# Patient Record
(demographics unavailable — no encounter records)

---

## 2024-10-10 NOTE — HEALTH RISK ASSESSMENT
[0] : 2) Feeling down, depressed, or hopeless: Not at all (0) [PHQ-2 Negative - No further assessment needed] : PHQ-2 Negative - No further assessment needed [Never] : Never [[id number]] : 0

## 2024-10-10 NOTE — ADDENDUM
[FreeTextEntry1] : This note was written by Milagro Garland on 10/10/2024 acting as medical scribe for Dr. Lubna Fleming. I, Dr. Lubna Fleming, have read and attest that all the information, medical decision making and discharge instructions within are true and accurate.

## 2024-10-10 NOTE — HISTORY OF PRESENT ILLNESS
[FreeTextEntry1] : 5-month f/u  [de-identified] : 28 y/o F with PMH of Hpylori here for 5-month f/u.   Denies chest pain, SOB, GARAY, dizziness, diaphoresis, palpitations, LE swelling, orthopnea, syncope, n/v, headache.

## 2024-10-21 NOTE — HISTORY OF PRESENT ILLNESS
[FreeTextEntry1] : 5-month f/u  [de-identified] : 26 y/o F with PMH of Hpylori here today for 5-month f/u.   Denies chest pain, SOB, GARAY, dizziness, diaphoresis, palpitations, LE swelling, orthopnea, syncope, n/v, headache.

## 2024-10-21 NOTE — ADDENDUM
[FreeTextEntry1] : This note was written by Milagro Garland on 10/15/2024 acting as medical scribe for Dr. Lubna Fleming. I, Dr. Lubna Fleming, have read and attest that all the information, medical decision making and discharge instructions within are true and accurate.

## 2024-10-21 NOTE — HEALTH RISK ASSESSMENT
Hello,  last fill date:01-  Last quantity:30    Thank You,  Meagan Bishop  Pharmacy Technician  [facility]  [phone]     [0] : 2) Feeling down, depressed, or hopeless: Not at all (0) [PHQ-2 Negative - No further assessment needed] : PHQ-2 Negative - No further assessment needed [Never] : Never [[id number]] : 0

## 2024-11-19 NOTE — HEALTH RISK ASSESSMENT
[0] : 2) Feeling down, depressed, or hopeless: Not at all (0) [PHQ-2 Negative - No further assessment needed] : PHQ-2 Negative - No further assessment needed [Never] : Never [[id number]] : 0

## 2024-11-19 NOTE — HEALTH RISK ASSESSMENT
[0] : 2) Feeling down, depressed, or hopeless: Not at all (0) [PHQ-2 Negative - No further assessment needed] : PHQ-2 Negative - No further assessment needed [Never] : Never [[id number]] : 0

## 2024-11-19 NOTE — HISTORY OF PRESENT ILLNESS
[FreeTextEntry1] : 6 month f/u ER f/u [de-identified] : Ms. KINSEY is a 28 year old F with PMHx of H-pylori presenting for 6 month f/u. Pt reports that her heart was racing really fast on 11/6 and feeling a warm sensation only in left arm. Heart rate was 184 with daughter's watch went to the ER. 2 weeks ago. Symptoms have not reoccurred since. In ER, Bloodwork and troponin were neg, EKG, CT chest were all normal per pt. Pt was found to have low magnesium at 1.5 so was given a magnesium drip. Sx resolved on their own without medication. Pt also saw gastro Dr. Guadalupe who reviewed CT abd that showed mesenteric LAD and he recommended doing EGD with EUS but she never did it. Denies chest pain, sob, abrams, dizziness, diaphoresis, palpitations, LE swelling, orthopnea, syncope, n/v, headache. Denies GI symptoms, weight loss.

## 2024-11-19 NOTE — HISTORY OF PRESENT ILLNESS
[FreeTextEntry1] : 6 month f/u ER f/u [de-identified] : Ms. KINSEY is a 28 year old F with PMHx of H-pylori presenting for 6 month f/u. Pt reports that her heart was racing really fast on 11/6 and feeling a warm sensation only in left arm. Heart rate was 184 with daughter's watch went to the ER. 2 weeks ago. Symptoms have not reoccurred since. In ER, Bloodwork and troponin were neg, EKG, CT chest were all normal per pt. Pt was found to have low magnesium at 1.5 so was given a magnesium drip. Sx resolved on their own without medication. Pt also saw gastro Dr. Guadalupe who reviewed CT abd that showed mesenteric LAD and he recommended doing EGD with EUS but she never did it. Denies chest pain, sob, abrams, dizziness, diaphoresis, palpitations, LE swelling, orthopnea, syncope, n/v, headache. Denies GI symptoms, weight loss.

## 2024-11-19 NOTE — HEALTH RISK ASSESSMENT
[0] : 2) Feeling down, depressed, or hopeless: Not at all (0) [PHQ-2 Negative - No further assessment needed] : PHQ-2 Negative - No further assessment needed [Never] : Never [[id number]] : 0

## 2024-11-19 NOTE — ADDENDUM
[FreeTextEntry1] : This note was written by Maria Elena Ortiz on 11/18/2024 acting as medical scribe for Dr. Lubna Fleming. I, Dr. Lubna Fleming, have read and attest that all the information, medical decision making and discharge instructions within are true and accurate.

## 2024-11-20 NOTE — HISTORY OF PRESENT ILLNESS
[FreeTextEntry1] :  28 year female here for mild elevation of prolactin   Reported Symptoms:   Vision problems: No Breast discharge (not pregnant or nursing): No Enlargement of breasts: No   Appreciable increased fat buildup in face, neck, back, abdomen, chest: generalized weight gain Arms and legs becoming thin: No Purple stretch marks: No Hypertension: No Proximal muscle weakness: No   Extra growth in the skull, hands, and feet: No Deepened voice: No Change in facial appearance: No Wide spacing of teeth: No Joint pain: No Snoring or sleep apnea: No Diabetes or impaired glucose tolerance: No

## 2024-11-20 NOTE — PHYSICAL EXAM
[Alert] : alert [Well Nourished] : well nourished [Obese] : obese [No Acute Distress] : no acute distress [Well Developed] : well developed [Normal Sclera/Conjunctiva] : normal sclera/conjunctiva [EOMI] : extra ocular movement intact [No Proptosis] : no proptosis [Normal Oropharynx] : the oropharynx was normal [Supple] : the neck was supple [Thyroid Not Enlarged] : the thyroid was not enlarged [No Respiratory Distress] : no respiratory distress [No Accessory Muscle Use] : no accessory muscle use [Clear to Auscultation] : lungs were clear to auscultation bilaterally [Normal S1, S2] : normal S1 and S2 [Normal Rate] : heart rate was normal [Regular Rhythm] : with a regular rhythm [No Edema] : no peripheral edema [Pedal Pulses Normal] : the pedal pulses are present [Normal Bowel Sounds] : normal bowel sounds [Not Tender] : non-tender [Not Distended] : not distended [Soft] : abdomen soft [Normal Anterior Cervical Nodes] : no anterior cervical lymphadenopathy [Normal Posterior Cervical Nodes] : no posterior cervical lymphadenopathy [No Spinal Tenderness] : no spinal tenderness [Spine Straight] : spine straight [No Stigmata of Cushings Syndrome] : no stigmata of Cushings Syndrome [Normal Gait] : normal gait [Normal Strength/Tone] : muscle strength and tone were normal [No Rash] : no rash [Acanthosis Nigricans] : no acanthosis nigricans [Normal Reflexes] : deep tendon reflexes were 2+ and symmetric [No Tremors] : no tremors [Oriented x3] : oriented to person, place, and time

## 2025-01-08 NOTE — HISTORY OF PRESENT ILLNESS
[FreeTextEntry1] : Dear Pantera,   I had the pleasure of seeing your patient MAGDALENO KINSEY for Cardiometabolic evaluation.   As you know, she  is a Pleasant, 28 year old with a past medical history of Obesity.PCOS. Hyperlipidemia =============== =============== Obesity.PCOS. Hyperlipidemia - Discussed diet and exercise at length - Check Lpa/CRP - Strongly prefer to start with dietician prior to diet  CC: F/U Weight and Heart Health - Notes No CP/SOB/Palps/Leg swelling - Reports No F/C/N/V/Headaches - Reports Normal Exercise Tolerance - Reports no medication changes - Reports normal mood/quality of life - Reports no diet changes - Reports no body aches - Reports no recent colds/viruse - Recent labs/imaging reviewed including from Risk Assessment for 10 Year ACC/AHA Pooled Risk Cohort Equation places this person at < 7.5% Risk of ASCVD

## 2025-01-08 NOTE — DISCUSSION/SUMMARY
[FreeTextEntry1] : In summary   Mesha, 28 year old with a past medical history of Obesity.PCOS. Hyperlipidemia =============== =============== Obesity.PCOS. Hyperlipidemia - Discussed diet and exercise at length - Check Lpa/CRP - Strongly prefer to start with dietician prior to diet   Obesity management - Assessed - Impression is active - Sleep apnea assessed - Recent labs/imaging reviewed including from   Cholesterol management - Assessed - Impression is active; changes as per above - Discussed diet and exercise at length - Any lifestyle/medication changes as per above   Risk factors for cardiomyopathy - Assessed - Impression is stable   Cardiac Health optimization - Discussed diet and exercise at length - Discussed importance of monitoring and re-assessment of cardiac health on further visits      Pantera, kind thanks for the referral.   Diego García MD [facility] ROSALINDA BRIGHT Director, Preventive Cardiology Ericka Basurto [facility] [place]                                                                                                                                                                                                                                                                     --                                                                                                                                                                                                                                                                                                                                                                                                                                                                                                                                                                                                                                                                                                                            -----------

## 2025-01-08 NOTE — DISCUSSION/SUMMARY
[FreeTextEntry1] : In summary   Mesha, 28 year old with a past medical history of Obesity.PCOS. Hyperlipidemia =============== =============== Obesity.PCOS. Hyperlipidemia - Discussed diet and exercise at length - Check Lpa/CRP - Strongly prefer to start with dietician prior to diet   Obesity management - Assessed - Impression is active - Sleep apnea assessed - Recent labs/imaging reviewed including from   Cholesterol management - Assessed - Impression is active; changes as per above - Discussed diet and exercise at length - Any lifestyle/medication changes as per above   Risk factors for cardiomyopathy - Assessed - Impression is stable   Cardiac Health optimization - Discussed diet and exercise at length - Discussed importance of monitoring and re-assessment of cardiac health on further visits      Pantera, kind thanks for the referral.   Diego García MD [facility] ROSALINDA BRIGHT Director, Preventive Cardiology Ericka Basurto [facility] [place]                                                                                                                                                                                                                                                                     --                                                                                                                                                                                                                                                                                                                                                                                                                                                                                                                                                                                                                                                                                                                            -----------

## 2025-04-04 NOTE — ADDENDUM
[FreeTextEntry1] : This note was written by Maria Elena Ortiz on 03/31/2025 acting as medical scribe for Dr. Lubna Fleming. I, Dr. Lubna Fleming, have read and attest that all the information, medical decision making and discharge instructions within are true and accurate.

## 2025-04-04 NOTE — HEALTH RISK ASSESSMENT
[0] : 2) Feeling down, depressed, or hopeless: Not at all (0) [PHQ-2 Negative - No further assessment needed] : PHQ-2 Negative - No further assessment needed [Never] : Never [[id number]] : 0

## 2025-04-04 NOTE — HISTORY OF PRESENT ILLNESS
[FreeTextEntry1] : f/u [de-identified] : Ms. KINSEY is a 28 year old F with PMHx of H-pylori presenting for f/u. Pt reports intermittent abd pain with frequent urination and decreased urine volume after her UTI in January.  Denies chest pain, sob, abrams, dizziness, diaphoresis, palpitations, LE swelling, orthopnea, syncope, n/v, headache.

## 2025-04-09 NOTE — DISCUSSION/SUMMARY
[FreeTextEntry1] : In summary   Mesha, 28 year old with a past medical history of Obesity.PCOS. Hyperlipidemia =============== =============== Obesity.PCOS. Hyperlipidemia - Discussed diet and exercise at length - Check Lpa/CRP - Strongly prefer to start with dietician prior to diet   Obesity management - Assessed - Impression is active - Sleep apnea assessed - Recent labs/imaging reviewed including from   Cholesterol management - Assessed - Impression is active; changes as per above - Discussed diet and exercise at length - Any lifestyle/medication changes as per above   Risk factors for cardiomyopathy - Assessed - Impression is stable   Cardiac Health optimization - Discussed diet and exercise at length - Discussed importance of monitoring and re-assessment of cardiac health on further visits      Pantera, kind thanks for the referral.   Diego García MD [facility] ROSALINDA BRIGHT Director, Preventive Cardiology Ericka Basurto [facility] [place]                                                                                                                                                                                                                                                                     --                                                                                                                                                                                                                                                                                                                                                                                                                                                                                                                                                                                                                                                                                                                            -----------

## 2025-04-09 NOTE — DISCUSSION/SUMMARY
[FreeTextEntry1] : In summary   Mseha, 28 year old with a past medical history of Obesity.PCOS. Hyperlipidemia =============== =============== Obesity.PCOS. Hyperlipidemia - Discussed diet and exercise at length - Check Lpa/CRP - Strongly prefer to start with dietician prior to diet   Obesity management - Assessed - Impression is active - Sleep apnea assessed - Recent labs/imaging reviewed including from   Cholesterol management - Assessed - Impression is active; changes as per above - Discussed diet and exercise at length - Any lifestyle/medication changes as per above   Risk factors for cardiomyopathy - Assessed - Impression is stable   Cardiac Health optimization - Discussed diet and exercise at length - Discussed importance of monitoring and re-assessment of cardiac health on further visits      Pantera, kind thanks for the referral.   Diego García MD [facility] ROSALINDA BRIGHT Director, Preventive Cardiology Ericka Basurto [facility] [place]                                                                                                                                                                                                                                                                     --                                                                                                                                                                                                                                                                                                                                                                                                                                                                                                                                                                                                                                                                                                                            -----------